# Patient Record
Sex: MALE | Race: WHITE | URBAN - METROPOLITAN AREA
[De-identification: names, ages, dates, MRNs, and addresses within clinical notes are randomized per-mention and may not be internally consistent; named-entity substitution may affect disease eponyms.]

---

## 2017-02-01 ENCOUNTER — HISTORICAL (OUTPATIENT)
Dept: SURGERY | Facility: HOSPITAL | Age: 52
End: 2017-02-01

## 2017-02-02 ENCOUNTER — HISTORICAL (OUTPATIENT)
Dept: ANESTHESIOLOGY | Facility: HOSPITAL | Age: 52
End: 2017-02-02

## 2019-03-15 LAB
ABS NEUT (OLG): 5 X10(3)/MCL (ref 1.5–6.9)
ALBUMIN SERPL-MCNC: 3.7 GM/DL (ref 3.4–5)
ALBUMIN/GLOB SERPL: 0.9 RATIO
ALP SERPL-CCNC: 62 UNIT/L (ref 30–113)
ALT SERPL-CCNC: 87 UNIT/L (ref 10–45)
AST SERPL-CCNC: 30 UNIT/L (ref 15–37)
BASOPHILS # BLD AUTO: 0 X10(3)/MCL (ref 0–0.1)
BASOPHILS NFR BLD AUTO: 1 % (ref 0–1)
BILIRUB SERPL-MCNC: 0.5 MG/DL (ref 0.1–0.9)
BILIRUBIN DIRECT+TOT PNL SERPL-MCNC: 0.1 MG/DL (ref 0–0.3)
BILIRUBIN DIRECT+TOT PNL SERPL-MCNC: 0.4 MG/DL
BUN SERPL-MCNC: 17 MG/DL (ref 10–20)
CALCIUM SERPL-MCNC: 8.9 MG/DL (ref 8–10.5)
CHLORIDE SERPL-SCNC: 102 MMOL/L (ref 100–108)
CHOLEST SERPL-MCNC: 176 MG/DL (ref 140–200)
CHOLEST/HDLC SERPL: 4 MG/DL (ref 0–8)
CO2 SERPL-SCNC: 30 MMOL/L (ref 21–35)
CREAT SERPL-MCNC: 1.07 MG/DL (ref 0.7–1.3)
DEPRECATED CALCIDIOL+CALCIFEROL SERPL-MC: 28.59 NG/ML (ref 30–80)
EOSINOPHIL # BLD AUTO: 0.1 X10(3)/MCL (ref 0–0.6)
EOSINOPHIL NFR BLD AUTO: 2 % (ref 0–5)
ERYTHROCYTE [DISTWIDTH] IN BLOOD BY AUTOMATED COUNT: 14.1 % (ref 11.5–17)
GLOBULIN SER-MCNC: 4.3 GM/DL
GLUCOSE SERPL-MCNC: 86 MG/DL (ref 75–116)
HCT VFR BLD AUTO: 48.7 % (ref 42–52)
HDLC SERPL-MCNC: 44 MG/DL (ref 35–59)
HGB BLD-MCNC: 15.2 GM/DL (ref 14–18)
IMM GRANULOCYTES # BLD AUTO: 0.02 10*3/UL (ref 0–0.02)
IMM GRANULOCYTES NFR BLD AUTO: 0.3 % (ref 0–0.43)
LDLC SERPL CALC-MCNC: 136 MG/DL (ref 100–129)
LYMPHOCYTES # BLD AUTO: 1.6 X10(3)/MCL (ref 0.5–4.1)
LYMPHOCYTES NFR BLD AUTO: 22 % (ref 15–40)
MCH RBC QN AUTO: 29 PG (ref 27–34)
MCHC RBC AUTO-ENTMCNC: 31 GM/DL (ref 31–36)
MCV RBC AUTO: 92 FL (ref 80–99)
MONOCYTES # BLD AUTO: 0.6 X10(3)/MCL (ref 0–1.1)
MONOCYTES NFR BLD AUTO: 8 % (ref 4–12)
NEUTROPHILS # BLD AUTO: 5 X10(3)/MCL (ref 1.5–6.9)
NEUTROPHILS NFR BLD AUTO: 68 % (ref 43–75)
PLATELET # BLD AUTO: 275 X10(3)/MCL (ref 140–400)
PMV BLD AUTO: 10 FL (ref 6.8–10)
POTASSIUM SERPL-SCNC: 3.9 MMOL/L (ref 3.6–5.2)
PROT SERPL-MCNC: 8 GM/DL (ref 6.4–8.2)
PSA SERPL-MCNC: 1.8 NG/ML (ref 0–4)
RBC # BLD AUTO: 5.31 X10(6)/MCL (ref 4.7–6.1)
SODIUM SERPL-SCNC: 139 MMOL/L (ref 135–145)
TRIGL SERPL-MCNC: 73 MG/DL (ref 35–150)
TSH SERPL-ACNC: 3.7 MIU/ML (ref 0.36–3.74)
VLDLC SERPL CALC-MCNC: 15 MG/DL
WBC # SPEC AUTO: 7.3 X10(3)/MCL (ref 4.5–11.5)

## 2019-03-21 ENCOUNTER — HISTORICAL (OUTPATIENT)
Dept: ANESTHESIOLOGY | Facility: HOSPITAL | Age: 54
End: 2019-03-21

## 2021-01-21 ENCOUNTER — HISTORICAL (OUTPATIENT)
Dept: ANESTHESIOLOGY | Facility: HOSPITAL | Age: 56
End: 2021-01-21

## 2021-01-21 LAB
ABS NEUT (OLG): 6.5 X10(3)/MCL (ref 1.5–6.9)
ALBUMIN SERPL-MCNC: 2.5 GM/DL (ref 3.5–5)
ALBUMIN/GLOB SERPL: 1.2 RATIO (ref 1.1–2)
ALP SERPL-CCNC: 38 UNIT/L (ref 40–150)
ALT SERPL-CCNC: 58 UNIT/L (ref 0–55)
AST SERPL-CCNC: 30 UNIT/L (ref 5–34)
BASOPHILS # BLD AUTO: 0 X10(3)/MCL (ref 0–0.1)
BASOPHILS NFR BLD AUTO: 0 % (ref 0–1)
BILIRUB SERPL-MCNC: 0.6 MG/DL
BILIRUBIN DIRECT+TOT PNL SERPL-MCNC: 0.3 MG/DL (ref 0–0.5)
BILIRUBIN DIRECT+TOT PNL SERPL-MCNC: 0.3 MG/DL (ref 0–0.8)
BUN SERPL-MCNC: 12 MG/DL (ref 8.4–25.7)
CALCIUM SERPL-MCNC: 7.6 MG/DL (ref 8.4–10.2)
CHLORIDE SERPL-SCNC: 102 MMOL/L (ref 98–107)
CHOLEST SERPL-MCNC: 100 MG/DL
CHOLEST/HDLC SERPL: 4 {RATIO} (ref 0–5)
CO2 SERPL-SCNC: 23 MMOL/L (ref 22–29)
CREAT SERPL-MCNC: 0.69 MG/DL (ref 0.73–1.18)
EOSINOPHIL # BLD AUTO: 0 X10(3)/MCL (ref 0–0.6)
EOSINOPHIL NFR BLD AUTO: 0 % (ref 0–5)
ERYTHROCYTE [DISTWIDTH] IN BLOOD BY AUTOMATED COUNT: 14.6 % (ref 11.5–17)
GLOBULIN SER-MCNC: 2.1 GM/DL (ref 2.4–3.5)
GLUCOSE SERPL-MCNC: 116 MG/DL (ref 74–100)
HCT VFR BLD AUTO: 36.7 % (ref 42–52)
HDLC SERPL-MCNC: 25 MG/DL (ref 35–60)
HGB BLD-MCNC: 11.9 GM/DL (ref 14–18)
IMM GRANULOCYTES # BLD AUTO: 0.05 10*3/UL (ref 0–0.02)
IMM GRANULOCYTES NFR BLD AUTO: 0.7 % (ref 0–0.43)
LDLC SERPL CALC-MCNC: 65 MG/DL (ref 50–140)
LYMPHOCYTES # BLD AUTO: 0.7 X10(3)/MCL (ref 0.5–4.1)
LYMPHOCYTES NFR BLD AUTO: 9 % (ref 15–40)
MCH RBC QN AUTO: 30 PG (ref 27–34)
MCHC RBC AUTO-ENTMCNC: 32 GM/DL (ref 31–36)
MCV RBC AUTO: 91 FL (ref 80–99)
MONOCYTES # BLD AUTO: 0.2 X10(3)/MCL (ref 0–1.1)
MONOCYTES NFR BLD AUTO: 3 % (ref 4–12)
NEUTROPHILS # BLD AUTO: 6.5 X10(3)/MCL (ref 1.5–6.9)
NEUTROPHILS NFR BLD AUTO: 87 % (ref 43–75)
PLATELET # BLD AUTO: 182 X10(3)/MCL (ref 140–400)
PMV BLD AUTO: 9.9 FL (ref 6.8–10)
POTASSIUM SERPL-SCNC: 4.3 MMOL/L (ref 3.5–5.1)
PROT SERPL-MCNC: 4.6 GM/DL (ref 6.4–8.3)
RBC # BLD AUTO: 4.02 X10(6)/MCL (ref 4.7–6.1)
SODIUM SERPL-SCNC: 135 MMOL/L (ref 136–145)
TRIGL SERPL-MCNC: 49 MG/DL (ref 34–140)
TSH SERPL-ACNC: 2.77 UIU/ML (ref 0.35–4.94)
VLDLC SERPL CALC-MCNC: 10 MG/DL
WBC # SPEC AUTO: 7.4 X10(3)/MCL (ref 4.5–11.5)

## 2022-04-30 NOTE — H&P
Patient:   Jericho Lopez             MRN: [id number]            FIN: [id number]               Age:   54 years     Sex:  Male     :  1965   Associated Diagnoses:   None   Author:   Radha Russo MD      Preoperative Information   Procedure/ Case: dental EUA   Surgeon scheduled: Mathew Garcia DDS   Location scheduled: [facility]   Anesthesiologist scheduled: Radha Russo MD   NPO after Midnight   Beta blocker within last 24 hrs:  Not Applicable.    Anesthesia history     Patient's history: negative.     Family's history: negative.        History of Present Illness   53 yo M for dental exam.  PMH includes MR, anxiety.      Review of Systems   Ear/Nose/Mouth/Throat:  Negative except as documented in history of present illness.    Respiratory:  Negative except as documented in history of present illness, No exertional dyspnea.    Cardiovascular:  Negative except as documented in history of present illness, No chest pain.    Gastrointestinal:  Negative except as documented in history of present illness.    Endocrine:  Negative except as documented in history of present illness.    Musculoskeletal:  Negative except as documented in history of present illness.    Neurologic:  Negative except as documented in history of present illness.       Health Status   Allergies:    Allergic Reactions (All)  No Known Allergies,    Allergies (1) Active Reaction  No Known Allergies None Documented   Current medications:  (Selected)   Inpatient Medications  Ordered  Buffered Lidocaine 1% - 1mL syringe: 0.5 mL, form: Injection, ID, As Directed PRN for other (see comment), first dose 19 5:40:00 CDT, May inject 0.5mL at IV site, if not allergic.  IVF Lactated Ringers LR Infusion 1,000 mL: 1,000 mL, 1,000 mL, IV, 20 mL/hr, start date 19 5:40:00 CDT,    Medications (2) Active  Scheduled: (0)  Continuous: (1)  lactated ringers 1,000 mL  1,000 mL, IV, 20 mL/hr  PRN: (1)  lidocaine 2% MDV Inj-20ml  0.5 mL, ID, As  Directed   Problem list:    All Problems  Anxiety / SNOMED CT [id number] / Confirmed  Dental caries / SNOMED CT 791098556 / Confirmed  Hyperlipemia / SNOMED CT [id number] / Confirmed  Mental retardation / SNOMED CT [id number] / Confirmed  Obesity / SNOMED CT 1064794324 / Probable  Canceled: Stroke / SNOMED CT 442645246  rigth weakness  family states no hx of stroke      Histories   Past Medical History:    No active or resolved past medical history items have been selected or recorded.   Family History:    Primary malignant neoplasm of prostate  Father  Diabetes mellitus type 1.  Father  Mother  Thyroid disease                                                                                                                                                                                                                         07-JUN-2017 17:52:08<$>  Mother     Procedure history:    Exam Under Anesthesia (None) on 2/2/2017 at 52 Years.  Comments:  2/2/2017 10:Slime Fuller RN  auto-populated from documented surgical case  Dental Restoration (None) on 2/2/2017 at 52 Years.  Comments:  2/2/2017 10:Slime Fuller RN  auto-populated from documented surgical case  dental.   Social History        Social & Psychosocial Habits    Alcohol  02/01/2017  Use: Never    Employment/School  03/15/2019  Status: disable    Exercise    Comment: daily activities - 03/15/2019 11:38 - Aylin Badillo LPN    Home/Environment  02/01/2017  Lives with: the arc    03/15/2019  Lives with: with assistance 24 hours per day    Nutrition/Health  03/15/2019  Type of diet: Regular    Other    Comment: mental retardation--stroke - 03/15/2019 11:40 - Aylin Badillo LPN    Sexual  03/15/2019  Do you think of your sexual orientation as: Something else, please de    Substance Abuse  02/01/2017  Use: Never    Tobacco  03/21/2019  Use: Never (less than 100 in l    Patient  [place] Consult For Cessation Counseling N/A.        Physical Examination      No qualifying data available   see nursing intake vital signs   unable to obtain   General:  No acute distress.    Airway:  Nares patent, Normal mouth, Normal throat.         Unable to examine: Patient uncooperative.         Distance: Thyromental, Adequate.         Temporomandibular joint mobility: Good.         Mouth: Adequate opening.         Throat: Within normal limits.    Head:  Normocephalic.    Neck:  Supple.    Respiratory:  Lungs are clear to auscultation, Respirations are non-labored, Breath sounds are equal.    Cardiovascular:  Normal rate, Regular rhythm.    Gastrointestinal:  Soft, Non-tender.    Neurologic:  Alert.    The H&P was reviewed, the patient was examined, and the following changes to the patient's condition are noted:  No changes to patient's condition.       Review / Management   Results review:     Labs (Last four charted values)  WBC                  7.3 (MAR 15)   Hgb                  15.2 (MAR 15)   Hct                  48.7 (MAR 15)   Plt                  275 (MAR 15)   Na                   139 (MAR 15)   K                    3.9 (MAR 15)   CO2                  30 (MAR 15)   Cl                   102 (MAR 15)   Cr                   1.07 (MAR 15)   BUN                  17 (MAR 15)   Glucose Random       86 (MAR 15) .    Documentation reviewed:  Reviewed prior records.       Assessment and Plan   American Society of Anesthesiologists (ASA) physical status classification:  Class II.    Anesthetic Preoperative Plan     Premedication: po valium .     Anesthetic technique: General anesthesia, Inhalation.     Induction: intravenously.     Maintenance airway: Oral endotracheal tube.     Risks discussed: nausea, vomiting, headache, sore throat, dental injury, hypotension, allergic reaction, serious complications, aspiration, intubation.     Informed consent: signed by patient, I explained anesthesia management and risks  to patient/responsible party if available.  Anesthesia consent done which covers all the risks.  I reviewed appropriate labs, any workup, X-ray, EKG etc.  Patients condition is satisfactory to proceed with anesthesia plan, unless noted otherwise..      .

## 2022-04-30 NOTE — OP NOTE
Patient:   Jericho Lopez             MRN: [id number]            FIN: [id number]               Age:   54 years     Sex:  Male     :  1965   Associated Diagnoses:   None   Author:   Mathew Garcia DDS      Operative Note   Operative Information   Date/ Time:  2016 08:28:00.     Procedures Performed: Periodic Oral exam, BW x-rays x 4,  PA x-rays x 4, Periodontal charting, scaling and root planing x 4 quadrants.  Dental restorations x 6, extractions x1, local anesthesia..     Preoperative Diagnosis: History of periodontal disease and dental decay..     Postoperative Diagnosis: Moderate to severe periodontal disease localized areas of dental decay, one large area of dental decay tooth #14..     Surgeon: Mathew Garcia DDS.     Anesthesia: General.     Description of Procedure/Findings/    Complications:   Patient was brought to OR, sedated, intubated nasally and placed under general anesthesia.  Periodic oral exam, bw x-rays x 4, PA x-rays x 4, periodontal charting completed.  BOP, gingival inflammation, and perio pockets 4-6mm generalized were noted.  Scaled and root planed all four quadrants using Cavitron with sterile water and hand scalers to fine scale.  Multiple areas (7) of dental decay noted on x-ray and clinical exam. Removed decay and placed TPH composite resin with Clearfil SE Bond on the following teeth and surfaces:  Tooth #2 DO, #2 L, #3 L, #4B, #5 B, #13 DO.  Tooth #14 noted on x-ray to have large distal carious lesion.  Sitter had reported him holding his cheek in pain on the left side, which would be consistent with the deep lesion.  Injected 2 carpules of lidocaine 2% with 1/100,000 epi on the buccal and lingual area around tooth #14.  Elevated and extracted tooth #14.  Gave Rx to patient's family for Norco 7.5mg, disp 15, one tab q 4-6 hours prn pain with no refills. Patient was cleaned, nasal hairs trimmed at mother's request by scrub nursetima, extubated, and sent to  recovery..     Findings: Moderate to severe periodontal disease, multiple areas of dental decay, one large area of decay..

## 2022-04-30 NOTE — OP NOTE
Patient:   Jericho Lopez             MRN: [id number]            FIN: [id number]               Age:   56 years     Sex:  Male     :  1965   Associated Diagnoses:   None   Author:   Mathew Garcia DDS      Operative Note   Operative Information   Date/ Time:  2021 10:08:00.     Preoperative Diagnosis: Multiple areas of dental caries, periodontal disease.     Postoperative Diagnosis: Multiple areas of dentall caries, moderate to severe periodontal disease, large area of decay on tooth #2 (nonfunctional tooth)..     Surgeon: Mathew Garcia DDS.     Anesthesia: General anesthesia in OR, local 2cc of lidocaine 2% near area of tooth#2.     Orders.     Description of Procedure/Findings/    Complications: Patient brought to OR, sedated, intubated orally, and placed under general anesthesia.  BW x-rays taken on right side (difficulty due to low palate).  Periodontal charting and tooth exam completed on right side.  Noted 2-6mm pockets, inflammation, and bleedy tissue.  Placed buccal restorations on teeth #'s 3 and 4 using TPH composite and Clearfil SE Lua.  Tooth #2 had large filling and large area of dental decay.  Tooth was nonfunctional and elected to remove the tooth instead of further restoring.  Placed 2cc lidocaine 2% on buccal and palatal gingiva in area of #2 extraction site.  Scaled and root planed teeth using cavitron with sterile water and hand scalers.  Moved to left side and perio charted and performed carito exam and took BW x-rays.  Placed  restoration on #12 and B restoration on #17.  Scaled and root planed teeth with the same gingival conditions seen on the other side.  Cleaned, roused and extubated patient and sent to recovery.  Post op instructions given to mother..

## 2024-10-16 ENCOUNTER — CLINICAL SUPPORT (OUTPATIENT)
Dept: RESPIRATORY THERAPY | Facility: HOSPITAL | Age: 59
End: 2024-10-16
Attending: ANESTHESIOLOGY
Payer: MEDICARE

## 2024-10-16 DIAGNOSIS — Z01.818 PREOP EXAMINATION: Primary | ICD-10-CM

## 2024-10-16 DIAGNOSIS — Z01.818 PREOP EXAMINATION: ICD-10-CM

## 2024-10-16 PROCEDURE — 93010 ELECTROCARDIOGRAM REPORT: CPT | Mod: ,,, | Performed by: INTERNAL MEDICINE

## 2024-10-16 PROCEDURE — 93005 ELECTROCARDIOGRAM TRACING: CPT

## 2024-10-16 RX ORDER — DOCUSATE SODIUM 100 MG/1
100 CAPSULE, LIQUID FILLED ORAL 2 TIMES DAILY PRN
COMMUNITY

## 2024-10-17 LAB
OHS QRS DURATION: 90 MS
OHS QTC CALCULATION: 471 MS

## 2024-10-23 ENCOUNTER — ANESTHESIA EVENT (OUTPATIENT)
Dept: SURGERY | Facility: HOSPITAL | Age: 59
End: 2024-10-23
Payer: MEDICARE

## 2024-10-23 NOTE — ANESTHESIA PREPROCEDURE EVALUATION
10/23/2024  Jericho Lopez is a 59 y.o., male.      Pre-op Assessment    I have reviewed the Patient Summary Reports.     I have reviewed the Nursing Notes. I have reviewed the NPO Status.   I have reviewed the Medications.     Review of Systems  Anesthesia Hx:             Denies Family Hx of Anesthesia complications.    Denies Personal Hx of Anesthesia complications.                    Social:  Non-Smoker, No Alcohol Use       Hematology/Oncology:  Hematology Normal   Oncology Normal                                   EENT/Dental:  EENT/Dental Normal           Cardiovascular:  Cardiovascular Normal                  ECG has been reviewed.                            Pulmonary:  Pulmonary Normal                       Renal/:  Renal/ Normal                 Hepatic/GI:  Hepatic/GI Normal                    Musculoskeletal:  Musculoskeletal Normal                Neurological:  Neurology Normal          MR- profound                            Endocrine:  Endocrine Normal            Dermatological:  Skin Normal    Psych:  Psychiatric History                  Physical Exam  General: Cooperative, Alert and Oriented    Airway:  Mallampati: II   Mouth Opening: Normal  TM Distance: Normal  Tongue: Large  Neck ROM: Flexion Decreased, Extension Decreased    Dental:  Periodontal disease, Caps / Implants        Anesthesia Plan  Type of Anesthesia, risks & benefits discussed:    Anesthesia Type: Gen ETT  Intra-op Monitoring Plan: Standard ASA Monitors  Post Op Pain Control Plan: multimodal analgesia  Induction:  IV  Airway Plan: Direct  Informed Consent: Informed consent signed with the Patient representative and all parties understand the risks and agree with anesthesia plan.  All questions answered. Patient consented to blood products? Yes  ASA Score: 3    Ready For Surgery From Anesthesia Perspective.     .

## 2024-10-24 ENCOUNTER — HOSPITAL ENCOUNTER (OUTPATIENT)
Facility: HOSPITAL | Age: 59
Discharge: HOME | End: 2024-10-24
Attending: PEDIATRICS | Admitting: DENTIST
Payer: MEDICARE

## 2024-10-24 ENCOUNTER — ANESTHESIA (OUTPATIENT)
Dept: SURGERY | Facility: HOSPITAL | Age: 59
End: 2024-10-24
Payer: MEDICARE

## 2024-10-24 VITALS
OXYGEN SATURATION: 94 % | BODY MASS INDEX: 31.67 KG/M2 | SYSTOLIC BLOOD PRESSURE: 142 MMHG | HEIGHT: 65 IN | HEART RATE: 93 BPM | TEMPERATURE: 98 F | DIASTOLIC BLOOD PRESSURE: 88 MMHG | WEIGHT: 190.06 LBS | RESPIRATION RATE: 18 BRPM

## 2024-10-24 VITALS
SYSTOLIC BLOOD PRESSURE: 147 MMHG | DIASTOLIC BLOOD PRESSURE: 104 MMHG | HEART RATE: 83 BPM | OXYGEN SATURATION: 96 % | TEMPERATURE: 94 F

## 2024-10-24 DIAGNOSIS — K02.9 DENTAL CARIES: ICD-10-CM

## 2024-10-24 PROCEDURE — 37000009 HC ANESTHESIA EA ADD 15 MINS: Performed by: DENTIST

## 2024-10-24 PROCEDURE — 36000704 HC OR TIME LEV I 1ST 15 MIN: Performed by: DENTIST

## 2024-10-24 PROCEDURE — 37000008 HC ANESTHESIA 1ST 15 MINUTES: Performed by: DENTIST

## 2024-10-24 PROCEDURE — 25000003 PHARM REV CODE 250: Performed by: NURSE ANESTHETIST, CERTIFIED REGISTERED

## 2024-10-24 PROCEDURE — 71000016 HC POSTOP RECOV ADDL HR: Performed by: DENTIST

## 2024-10-24 PROCEDURE — 36000705 HC OR TIME LEV I EA ADD 15 MIN: Performed by: DENTIST

## 2024-10-24 PROCEDURE — 71000015 HC POSTOP RECOV 1ST HR: Performed by: DENTIST

## 2024-10-24 PROCEDURE — 25000242 PHARM REV CODE 250 ALT 637 W/ HCPCS: Performed by: ANESTHESIOLOGY

## 2024-10-24 PROCEDURE — 71000033 HC RECOVERY, INTIAL HOUR: Performed by: DENTIST

## 2024-10-24 PROCEDURE — 63600175 PHARM REV CODE 636 W HCPCS: Performed by: NURSE ANESTHETIST, CERTIFIED REGISTERED

## 2024-10-24 RX ORDER — FENTANYL CITRATE 50 UG/ML
INJECTION, SOLUTION INTRAMUSCULAR; INTRAVENOUS
Status: DISCONTINUED | OUTPATIENT
Start: 2024-10-24 | End: 2024-10-24

## 2024-10-24 RX ORDER — DEXMEDETOMIDINE HYDROCHLORIDE 100 UG/ML
INJECTION, SOLUTION INTRAVENOUS
Status: DISCONTINUED | OUTPATIENT
Start: 2024-10-24 | End: 2024-10-24

## 2024-10-24 RX ORDER — LIDOCAINE HYDROCHLORIDE 20 MG/ML
INJECTION INTRAVENOUS
Status: DISCONTINUED | OUTPATIENT
Start: 2024-10-24 | End: 2024-10-24

## 2024-10-24 RX ORDER — SODIUM CHLORIDE 0.9 % (FLUSH) 0.9 %
10 SYRINGE (ML) INJECTION
Status: DISCONTINUED | OUTPATIENT
Start: 2024-10-24 | End: 2024-10-24

## 2024-10-24 RX ORDER — ONDANSETRON HYDROCHLORIDE 2 MG/ML
INJECTION, SOLUTION INTRAMUSCULAR; INTRAVENOUS
Status: DISCONTINUED | OUTPATIENT
Start: 2024-10-24 | End: 2024-10-24

## 2024-10-24 RX ORDER — KETAMINE HYDROCHLORIDE 50 MG/ML
INJECTION, SOLUTION INTRAMUSCULAR; INTRAVENOUS
Status: DISCONTINUED | OUTPATIENT
Start: 2024-10-24 | End: 2024-10-24

## 2024-10-24 RX ORDER — EPHEDRINE SULFATE 50 MG/ML
INJECTION, SOLUTION INTRAVENOUS
Status: DISCONTINUED | OUTPATIENT
Start: 2024-10-24 | End: 2024-10-24

## 2024-10-24 RX ORDER — GLUCAGON 1 MG
1 KIT INJECTION
Status: DISCONTINUED | OUTPATIENT
Start: 2024-10-24 | End: 2024-10-24

## 2024-10-24 RX ORDER — PROPOFOL 10 MG/ML
VIAL (ML) INTRAVENOUS
Status: DISCONTINUED | OUTPATIENT
Start: 2024-10-24 | End: 2024-10-24

## 2024-10-24 RX ORDER — ROCURONIUM BROMIDE 10 MG/ML
INJECTION, SOLUTION INTRAVENOUS
Status: DISCONTINUED | OUTPATIENT
Start: 2024-10-24 | End: 2024-10-24

## 2024-10-24 RX ORDER — DIAZEPAM 5 MG/5ML
10 SOLUTION ORAL
Status: COMPLETED | OUTPATIENT
Start: 2024-10-24 | End: 2024-10-24

## 2024-10-24 RX ORDER — DEXAMETHASONE SODIUM PHOSPHATE 4 MG/ML
INJECTION, SOLUTION INTRA-ARTICULAR; INTRALESIONAL; INTRAMUSCULAR; INTRAVENOUS; SOFT TISSUE
Status: DISCONTINUED | OUTPATIENT
Start: 2024-10-24 | End: 2024-10-24

## 2024-10-24 RX ORDER — PHENYLEPHRINE HYDROCHLORIDE 10 MG/ML
INJECTION INTRAVENOUS
Status: DISCONTINUED | OUTPATIENT
Start: 2024-10-24 | End: 2024-10-24

## 2024-10-24 RX ADMIN — KETAMINE HYDROCHLORIDE 25 MG: 50 INJECTION, SOLUTION, CONCENTRATE INTRAMUSCULAR; INTRAVENOUS at 08:10

## 2024-10-24 RX ADMIN — LIDOCAINE HYDROCHLORIDE 100 MG: 20 INJECTION, SOLUTION INTRAVENOUS at 06:10

## 2024-10-24 RX ADMIN — DIAZEPAM 10 MG: 5 SOLUTION ORAL at 06:10

## 2024-10-24 RX ADMIN — PROPOFOL 150 MG: 10 INJECTION, EMULSION INTRAVENOUS at 06:10

## 2024-10-24 RX ADMIN — SUGAMMADEX 200 MG: 100 INJECTION, SOLUTION INTRAVENOUS at 08:10

## 2024-10-24 RX ADMIN — EPHEDRINE SULFATE 25 MG: 50 INJECTION INTRAVENOUS at 07:10

## 2024-10-24 RX ADMIN — PHENYLEPHRINE HYDROCHLORIDE 100 MCG: 10 INJECTION INTRAVENOUS at 08:10

## 2024-10-24 RX ADMIN — PHENYLEPHRINE HYDROCHLORIDE 100 MCG: 10 INJECTION INTRAVENOUS at 07:10

## 2024-10-24 RX ADMIN — DEXMEDETOMIDINE 2 MCG: 200 INJECTION, SOLUTION INTRAVENOUS at 07:10

## 2024-10-24 RX ADMIN — DEXMEDETOMIDINE 4 MCG: 200 INJECTION, SOLUTION INTRAVENOUS at 08:10

## 2024-10-24 RX ADMIN — ROCURONIUM BROMIDE 40 MG: 10 INJECTION, SOLUTION INTRAVENOUS at 06:10

## 2024-10-24 RX ADMIN — DEXMEDETOMIDINE 4 MCG: 200 INJECTION, SOLUTION INTRAVENOUS at 07:10

## 2024-10-24 RX ADMIN — DEXMEDETOMIDINE 6 MCG: 200 INJECTION, SOLUTION INTRAVENOUS at 08:10

## 2024-10-24 RX ADMIN — DEXAMETHASONE SODIUM PHOSPHATE 4 MG: 4 INJECTION, SOLUTION INTRA-ARTICULAR; INTRALESIONAL; INTRAMUSCULAR; INTRAVENOUS; SOFT TISSUE at 07:10

## 2024-10-24 RX ADMIN — KETAMINE HYDROCHLORIDE 25 MG: 50 INJECTION, SOLUTION, CONCENTRATE INTRAMUSCULAR; INTRAVENOUS at 07:10

## 2024-10-24 RX ADMIN — ONDANSETRON 4 MG: 2 INJECTION INTRAMUSCULAR; INTRAVENOUS at 07:10

## 2024-10-24 RX ADMIN — FENTANYL CITRATE 100 MCG: 50 INJECTION, SOLUTION INTRAMUSCULAR; INTRAVENOUS at 06:10

## 2024-10-24 NOTE — ANESTHESIA POSTPROCEDURE EVALUATION
Anesthesia Post Evaluation    Patient: Jericho Lopez    Procedure(s) Performed: Procedure(s) (LRB):  RESTORATION, TOOTH, TOOTH EXTRACTION, OR DENTAL PROPHYLAXIS, WITH GENERAL ANESTHESIA (N/A)    Final Anesthesia Type: general      Patient location during evaluation: PACU  Patient participation: Yes- Able to Participate  Level of consciousness: awake and alert  Post-procedure vital signs: reviewed and stable  Pain management: adequate  Airway patency: patent  RAOUL mitigation strategies: Multimodal analgesia, Verification of full reversal of neuromuscular block and Extubation and recovery carried out in lateral, semiupright, or other nonsupine position  PONV status at discharge: No PONV  Anesthetic complications: no      Cardiovascular status: hemodynamically stable  Respiratory status: unassisted, spontaneous ventilation and room air  Hydration status: euvolemic  Follow-up not needed.              Vitals Value Taken Time   /131 10/24/24 0932   Temp 36 °C (96.8 °F) 10/24/24 0912   Pulse 97 10/24/24 0934   Resp 25 10/24/24 0934   SpO2 93 % 10/24/24 0934   Vitals shown include unfiled device data.      No case tracking events are documented in the log.      Pain/Wilder Score: Wilder Score: 8 (10/24/2024  9:26 AM)

## 2024-10-24 NOTE — TRANSFER OF CARE
"Anesthesia Transfer of Care Note    Patient: Jericho Lopez    Procedure(s) Performed: Procedure(s) (LRB):  RESTORATION, TOOTH, TOOTH EXTRACTION, OR DENTAL PROPHYLAXIS, WITH GENERAL ANESTHESIA (N/A)    Patient location: PACU    Anesthesia Type: general    Transport from OR: Transported from OR on room air with adequate spontaneous ventilation    Post pain: adequate analgesia    Post assessment: no apparent anesthetic complications    Post vital signs: stable    Level of consciousness: responds to stimulation and sedated    Nausea/Vomiting: no nausea/vomiting    Complications: none    Transfer of care protocol was followed      Last vitals: Visit Vitals  BP (!) 171/105 (BP Location: Right arm, Patient Position: Lying)   Pulse 82   Temp 36 °C (96.8 °F) (Temporal)   Resp 18   Ht 5' 5" (1.651 m)   Wt 86.2 kg (190 lb 0.6 oz)   SpO2 (!) 94%   BMI 31.62 kg/m²     "

## 2024-10-24 NOTE — TRANSFER OF CARE
"Anesthesia Transfer of Care Note    Patient: Jericho Lopez    Procedure(s) Performed: Procedure(s) (LRB):  RESTORATION, TOOTH, TOOTH EXTRACTION, OR DENTAL PROPHYLAXIS, WITH GENERAL ANESTHESIA (N/A)    Patient location: PACU    Anesthesia Type: general    Transport from OR: Transported from OR on room air with adequate spontaneous ventilation    Post pain: adequate analgesia    Post assessment: no apparent anesthetic complications    Post vital signs: stable    Level of consciousness: responds to stimulation and sedated    Nausea/Vomiting: no nausea/vomiting    Complications: none    Transfer of care protocol was followed      Last vitals: Visit Vitals  BP (!) 142/81   Pulse 80   Temp 36 °C (96.8 °F) (Temporal)   Resp 19   Ht 5' 5" (1.651 m)   Wt 86.2 kg (190 lb 0.6 oz)   SpO2 (!) 94%   BMI 31.62 kg/m²     "

## (undated) DEVICE — SET ADMIN GRAVITY 104IN 17ML

## (undated) DEVICE — CAP BOUFFANT HYPOTHERMIA MED

## (undated) DEVICE — GLOVE PROTEXIS HYDROGEL SZ6.5

## (undated) DEVICE — POSITIONER HEEL FOAM CONVOLTD

## (undated) DEVICE — GOWN POLY REINF BRTH SLV XL

## (undated) DEVICE — BLANKET LOWER BODY 55.9X40.2IN

## (undated) DEVICE — GOWN POLY REINF BRTH SLV LG

## (undated) DEVICE — BLANKET THERMOFLECT 4X7

## (undated) DEVICE — GLOVE PROTEXIS HYDROGEL SZ6

## (undated) DEVICE — SLEEVE SCD EXPRESS CALF MEDIUM

## (undated) DEVICE — KIT IV START TEGADERM STD

## (undated) DEVICE — CATH IV STR HUB RADPQ 22X1 IN

## (undated) DEVICE — SUPPORT ULNA NERVE PROTECTOR

## (undated) DEVICE — COVER LIGHT HANDLE RIGID GRN

## (undated) DEVICE — COVER PROXIMA MAYO STAND